# Patient Record
Sex: MALE | Race: WHITE | Employment: OTHER | URBAN - METROPOLITAN AREA
[De-identification: names, ages, dates, MRNs, and addresses within clinical notes are randomized per-mention and may not be internally consistent; named-entity substitution may affect disease eponyms.]

---

## 2018-04-19 ENCOUNTER — HOSPITAL ENCOUNTER (OUTPATIENT)
Dept: NON INVASIVE DIAGNOSTICS | Age: 73
Discharge: HOME | End: 2018-04-19
Attending: FAMILY MEDICINE | Admitting: FAMILY MEDICINE

## 2018-04-19 DIAGNOSIS — R00.2 PALPITATIONS: ICD-10-CM

## 2018-04-24 LAB
ACQUISITION DURATION: NORMAL S
AVERAGE HEART RATE: 55 BPM
EKG DIAGNOSIS: NORMAL
FASTEST SUPRAVENTRICULAR RATE: 93 BPM
HOLTER MAX HEART RATE: 115 BPM
HOOKUP DATE: NORMAL
HOOKUP TIME: NORMAL
LONGEST SUPRAVENTRICULAR RATE: 93 BPM
Lab: NORMAL
MAX HEART RATE TIME/DATE: NORMAL
MIN HEART RATE TIME/DATE: NORMAL
MIN HEART RATE: 38 BPM
NUMBER OF FASTEST SUPRAVENTRICULAR BEATS: 7
NUMBER OF LONGEST SUPRAVENTRICULAR BEATS: 7
NUMBER OF QRS COMPLEXES: NORMAL
NUMBER OF SUPRAVENTRICULAR BEATS IN RUNS: 7
NUMBER OF SUPRAVENTRICULAR COUPLETS: 0
NUMBER OF SUPRAVENTRICULAR ECTOPICS: 24
NUMBER OF SUPRAVENTRICULAR ISOLATED BEATS: 17
NUMBER OF SUPRAVENTRICULAR RUNS: 1
NUMBER OF VENTRICULAR BEATS IN RUNS: 0
NUMBER OF VENTRICULAR BIGEMINAL CYCLES: 40
NUMBER OF VENTRICULAR COUPLETS: 6
NUMBER OF VENTRICULAR ECTOPICS: 157
NUMBER OF VENTRICULAR ISOLATED BEATS: 145
NUMBER OF VENTRICULAR RUNS: 0

## 2018-07-09 ENCOUNTER — HOSPITAL ENCOUNTER (OUTPATIENT)
Dept: NON INVASIVE DIAGNOSTICS | Age: 73
Discharge: HOME | End: 2018-07-09
Attending: FAMILY MEDICINE | Admitting: FAMILY MEDICINE

## 2018-07-09 DIAGNOSIS — I20.8 OTHER FORMS OF ANGINA PECTORIS (HCC): ICD-10-CM

## 2021-05-05 ENCOUNTER — TELEPHONE (OUTPATIENT)
Dept: PULMONOLOGY | Age: 76
End: 2021-05-05

## 2021-05-05 NOTE — TELEPHONE ENCOUNTER
Called pt to make ref appt from [facility], left message to call back. Make sure to tell pt to bring cat scan on a disc.

## 2021-05-12 ENCOUNTER — TELEPHONE (OUTPATIENT)
Dept: PULMONOLOGY | Age: 76
End: 2021-05-12

## 2021-05-12 NOTE — TELEPHONE ENCOUNTER
Received referral from Pooja Pickett ([address]), called pt, left message for him to call us back to schedule appt. Also need to tell pt to bring disk of scans.

## 2021-06-11 ENCOUNTER — OFFICE VISIT (OUTPATIENT)
Dept: PULMONOLOGY | Age: 76
End: 2021-06-11
Payer: MEDICARE

## 2021-06-11 VITALS
SYSTOLIC BLOOD PRESSURE: 114 MMHG | HEIGHT: 69 IN | WEIGHT: 160 LBS | HEART RATE: 68 BPM | BODY MASS INDEX: 23.7 KG/M2 | OXYGEN SATURATION: 95 % | DIASTOLIC BLOOD PRESSURE: 70 MMHG

## 2021-06-11 DIAGNOSIS — J47.1 BRONCHIECTASIS WITH ACUTE EXACERBATION (HCC): ICD-10-CM

## 2021-06-11 DIAGNOSIS — Z85.528 H/O RENAL CELL CARCINOMA: ICD-10-CM

## 2021-06-11 DIAGNOSIS — Z87.891 HISTORY OF TOBACCO ABUSE: ICD-10-CM

## 2021-06-11 DIAGNOSIS — R93.89 ABNORMAL CT OF THE CHEST: Primary | ICD-10-CM

## 2021-06-11 PROCEDURE — 1036F TOBACCO NON-USER: CPT | Performed by: INTERNAL MEDICINE

## 2021-06-11 PROCEDURE — 3017F COLORECTAL CA SCREEN DOC REV: CPT | Performed by: INTERNAL MEDICINE

## 2021-06-11 PROCEDURE — G8427 DOCREV CUR MEDS BY ELIG CLIN: HCPCS | Performed by: INTERNAL MEDICINE

## 2021-06-11 PROCEDURE — G8420 CALC BMI NORM PARAMETERS: HCPCS | Performed by: INTERNAL MEDICINE

## 2021-06-11 PROCEDURE — 99204 OFFICE O/P NEW MOD 45 MIN: CPT | Performed by: INTERNAL MEDICINE

## 2021-06-11 PROCEDURE — 4040F PNEUMOC VAC/ADMIN/RCVD: CPT | Performed by: INTERNAL MEDICINE

## 2021-06-11 PROCEDURE — 1123F ACP DISCUSS/DSCN MKR DOCD: CPT | Performed by: INTERNAL MEDICINE

## 2021-06-11 RX ORDER — LEVOFLOXACIN 500 MG/1
500 TABLET, FILM COATED ORAL DAILY
Qty: 5 TABLET | Refills: 0 | Status: SHIPPED | OUTPATIENT
Start: 2021-06-11 | End: 2021-06-16

## 2021-06-11 NOTE — PROGRESS NOTES
PULMONARY OFFICE NEW PATIENT VISIT    CONSULTING PHYSICIAN:  Nuvia BHARDWAJ ,     REASON FOR VISIT:   Chief Complaint   Patient presents with    New Patient     ref by dr Kristian Palmer Other     bronchiectasis       DATE OF VISIT: 6/11/2021    HISTORY OF PRESENT ILLNESS: 76y.o. year old male comes into the pulmonary clinic for the first time. Patient reports that he had an ED surveillance CT chest done last year which revealed abnormal findings for which she has been referred to a pulmonologist.  He is diagnosed to have bronchiectasis for last several years with infrequent exacerbations. He does produce about quarter to half cup of mucus every day. Mucus is yellowish colored. He denies any hemoptysis, fevers, night sweats. No history of asthma attacks. Patient did smoke a few cigarettes every day for 25 years before quitting in 1985. He was an  and was not occupationally exposed to hazardous substance. He did get exposed to agent orange when he was serving in [facility] [employer].  Also has a history of renal cancer 6 years ago with a recent history of bladder cancer. Denies any anorexia or weight loss. No difficulty in expectorating mucus. Patient is fairly active. REVIEW OF SYSTEMS:   CONSTITUTIONAL SYMPTOMS: The patient denies fever, fatigue, night sweats, weight loss or weight gain. HEENT: No vision changes. No tinnitus, Denies sinus pain. No hoarseness, or dysphagia. NECK: Patient denies swelling in the neck. CARDIOVASCULAR: Denies chest pain, palpitation, syncope. RESPIRATORY: as per HPI. GASTROINTESTINAL: Denies nausea, abdominal pain or change in bowel function. GENITOURINARY: Denies obstructive symptoms. No history of incontinence. BREASTS: No masses or lumps in the breasts. SKIN: No rashes or itching. MUSCULOSKELETAL: Denies weakness or bone pain. NEUROLOGICAL: No headaches or seizures. PSYCHIATRIC: Denies mood swings or depression.    ENDOCRINE: Denies heat or cold lymphadenopathy:  CARDIOVASCULAR: S1 S2 RRR. Without murmer  RESPIRATORY & CHEST: bibasilar crackles heard with no wheezing heard. GASTROINTESTINAL & ABDOMEN: Soft, nontender, positive bowel sounds in all quadrants, non-distended, without hepatosplenomegaly. GENITOURINARY: Deferred. MUSCULOSKELETAL: No tenderness to palpation of the axial skeleton. There is no clubbing. No cyanosis. No edema of the lower extremities. SKIN OF BODY: No rash or jaundice. PSYCHIATRIC EVALUATION: Normal affect. Patient answers questions appropriately. HEMATOLOGIC/LYMPHATIC/ IMMUNOLOGIC: No palpable lymphadenopathy. NEUROLOGIC: Alert and oriented x 3. Groslly non-focal. Motor strength is 5+/5 in all muscle groups. The patient has a normal sensorium globally. LABS:    Lab Summary Latest Ref Rng & Units 9/18/2013 8/23/2010   WBC 4.0 - 11.0 K/uL 10.1 -   Hgb 13.5 - 17.5 g/dL 13.6 -   Hct 40.5 - 52.5 % 40.6 -   Platelets 530 - 261 K/uL 241 -   Sodium 136 - 145 mEq/L 140 141   Potassium 3.5 - 5.1 mEq/L 4.4 4.3   BUN 7 - 18 mg/dL 20(H) 21(H)   Creatinine 0.8 - 1.3 mg/dL 0.8 0.8   Glucose 70 - 99 mg/dL 93 89   Calcium 8.3 - 10.6 mg/dL 10.2 10.0   AST 15 - 37 U/L - 24   HDL cholesterol 40 - 60 mg/dl - 60   Triglycerides <150 mg/dl - 63   LDL calc <100 mg/dl - 92   VLDL calc Not Established mg/dl - 13           IMAGING:    CT chest done on 11/18/2020 at [facility]    FINDINGS:   LUNGS/AIRWAYS:  Unchanged widespread tree-in-bud nodularity, multifocal scarring, peribronchial thickening, and bronchiectasis. No new or enlarging pulmonary nodule. PLEURA: Unremarkable.  No pleural effusion or pneumothorax   MEDIASTINUM/MARIMAR:  Unremarkable   HEART/PERICARDIUM:  Mild coronary artery calcifications   VESSELS:  Unremarkable.  No aneurysm   CHEST WALL/LOWER NECK:  Unremarkable   UPPER ABDOMEN:  Left kidney is surgically absent   BONES:  Degenerative changes of the spine.    OTHER:  None            Pulmonary Functions Testing Results:          IMPRESSION AND RECOMMENDATIONS:     1. Abnormal CT of the chest  -I personally reviewed patient's CT chest from November 18, 2020. There is bilateral extensive bronchiectatic changes seen. Tree-in-bud appearance is more reflective of bronchiolitis than actual pulmonary nodules.  -No suspicious pulmonary nodules/masses seen. No suspicion for lung cancer.  -Patient currently having yellowish expectoration. I will give him a 5-day course of levofloxacin 500 mg p.o. daily.  -Also advised the patient to use Acapella device which he already has in order to help him expectorate more effectively.  -We will get a complete PFT done to assess for obstructive airway disease and to estimate his lung capacity. 2. H/O renal cell carcinoma  -Patient to follow-up with his primary oncologist.    3. History of tobacco abuse  -Strongly urged the patient to stay quit from smoking. Return in about 6 months (around 12/11/2021). Ginna Babb MD  Pulmonary Critical Care and Sleep Medicine  6/11/2021, 3:06 PM    This note was completed using dragon medical speech recognition software. Grammatical errors, random word insertions, pronoun errors and incomplete sentences are occasional consequences of this technology due to software limitations. If there are questions or concerns about the content of this note of information contained within the body of this dictation they should be addressed with the provider for clarification.

## 2021-12-13 ENCOUNTER — HOSPITAL ENCOUNTER (OUTPATIENT)
Dept: PULMONOLOGY | Age: 76
Discharge: HOME | End: 2021-12-13
Payer: MEDICARE

## 2021-12-13 DIAGNOSIS — J47.1 BRONCHIECTASIS WITH ACUTE EXACERBATION (HCC): ICD-10-CM

## 2021-12-13 LAB
DLCO %PRED: 65 %
DLCO PRED: NORMAL
DLCO/VA %PRED: NORMAL
DLCO/VA PRED: NORMAL
DLCO/VA: NORMAL
DLCO: NORMAL
EXPIRATORY TIME-POST: NORMAL
EXPIRATORY TIME: NORMAL
FEF 25-75% %CHNG: NORMAL
FEF 25-75% %PRED-POST: NORMAL
FEF 25-75% %PRED-PRE: NORMAL
FEF 25-75% PRED: NORMAL
FEF 25-75%-POST: NORMAL
FEF 25-75%-PRE: NORMAL
FEV1 %PRED-POST: 88 %
FEV1 %PRED-PRE: 92 %
FEV1 PRED: NORMAL
FEV1-POST: NORMAL
FEV1-PRE: NORMAL
FEV1/FVC %PRED-POST: NORMAL
FEV1/FVC %PRED-PRE: NORMAL
FEV1/FVC PRED: NORMAL
FEV1/FVC-POST: 97 %
FEV1/FVC-PRE: 92 %
FVC %PRED-POST: NORMAL
FVC %PRED-PRE: NORMAL
FVC PRED: NORMAL
FVC-POST: NORMAL
FVC-PRE: NORMAL
GAW %PRED: NORMAL
GAW PRED: NORMAL
GAW: NORMAL
IC %PRED: NORMAL
IC PRED: NORMAL
IC: NORMAL
MEP: NORMAL
MIP: NORMAL
MVV %PRED-PRE: NORMAL
MVV PRED: NORMAL
MVV-PRE: NORMAL
PEF %PRED-POST: NORMAL
PEF %PRED-PRE: NORMAL
PEF PRED: NORMAL
PEF%CHNG: NORMAL
PEF-POST: NORMAL
PEF-PRE: NORMAL
RAW %PRED: NORMAL
RAW PRED: NORMAL
RAW: NORMAL
RV %PRED: NORMAL
RV PRED: NORMAL
RV: NORMAL
SVC %PRED: NORMAL
SVC PRED: NORMAL
SVC: NORMAL
TLC %PRED: 82 %
TLC PRED: NORMAL
TLC: NORMAL
VA %PRED: NORMAL
VA PRED: NORMAL
VA: NORMAL
VTG %PRED: NORMAL
VTG PRED: NORMAL
VTG: NORMAL

## 2021-12-13 PROCEDURE — 6370000000 HC RX 637 (ALT 250 FOR IP): Performed by: INTERNAL MEDICINE

## 2021-12-13 PROCEDURE — 94640 AIRWAY INHALATION TREATMENT: CPT

## 2021-12-13 PROCEDURE — 94729 DIFFUSING CAPACITY: CPT

## 2021-12-13 PROCEDURE — 94060 EVALUATION OF WHEEZING: CPT

## 2021-12-13 PROCEDURE — 94726 PLETHYSMOGRAPHY LUNG VOLUMES: CPT

## 2021-12-13 RX ORDER — ALBUTEROL SULFATE 90 UG/1
4 AEROSOL, METERED RESPIRATORY (INHALATION) ONCE
Status: COMPLETED | OUTPATIENT
Start: 2021-12-13 | End: 2021-12-13

## 2021-12-13 RX ADMIN — ALBUTEROL SULFATE 4 PUFF: 90 AEROSOL, METERED RESPIRATORY (INHALATION) at 07:53

## 2021-12-13 ASSESSMENT — PULMONARY FUNCTION TESTS
FEV1/FVC_POST: 97
FEV1/FVC_PRE: 92
FEV1_PERCENT_PREDICTED_POST: 88
FEV1_PERCENT_PREDICTED_PRE: 92

## 2021-12-14 PROCEDURE — 94060 EVALUATION OF WHEEZING: CPT | Performed by: INTERNAL MEDICINE

## 2021-12-14 PROCEDURE — 94729 DIFFUSING CAPACITY: CPT | Performed by: INTERNAL MEDICINE

## 2021-12-14 PROCEDURE — 94726 PLETHYSMOGRAPHY LUNG VOLUMES: CPT | Performed by: INTERNAL MEDICINE

## 2022-01-06 ENCOUNTER — TELEPHONE (OUTPATIENT)
Dept: ORTHOPEDIC SURGERY | Age: 77
End: 2022-01-06

## 2022-01-07 ENCOUNTER — OFFICE VISIT (OUTPATIENT)
Dept: ORTHOPEDIC SURGERY | Age: 77
End: 2022-01-07
Payer: MEDICARE

## 2022-01-07 VITALS — WEIGHT: 160 LBS | BODY MASS INDEX: 23.7 KG/M2 | HEIGHT: 69 IN | RESPIRATION RATE: 16 BRPM

## 2022-01-07 DIAGNOSIS — M72.0 DUPUYTREN'S CONTRACTURE: Primary | ICD-10-CM

## 2022-01-07 PROCEDURE — 1123F ACP DISCUSS/DSCN MKR DOCD: CPT | Performed by: PHYSICIAN ASSISTANT

## 2022-01-07 PROCEDURE — G8427 DOCREV CUR MEDS BY ELIG CLIN: HCPCS | Performed by: PHYSICIAN ASSISTANT

## 2022-01-07 PROCEDURE — 99203 OFFICE O/P NEW LOW 30 MIN: CPT | Performed by: PHYSICIAN ASSISTANT

## 2022-01-07 PROCEDURE — 4040F PNEUMOC VAC/ADMIN/RCVD: CPT | Performed by: PHYSICIAN ASSISTANT

## 2022-01-07 PROCEDURE — 1036F TOBACCO NON-USER: CPT | Performed by: PHYSICIAN ASSISTANT

## 2022-01-07 PROCEDURE — G8484 FLU IMMUNIZE NO ADMIN: HCPCS | Performed by: PHYSICIAN ASSISTANT

## 2022-01-07 PROCEDURE — G8420 CALC BMI NORM PARAMETERS: HCPCS | Performed by: PHYSICIAN ASSISTANT

## 2022-01-07 NOTE — PROGRESS NOTES
Mr. Tanner Pryor is a 68 y.o. right handed man  who is seen today in Hand Surgical Consultation at the request of Paty Cassidy. He presents today regarding bilateral hand abnormality which has been present for approximately 10 years. A history of antecedent trauma or injury is Absent. He reports a thickening or mass on the palm with extension onto the Ring Finger. The size of the mass has been stable with time. He has noted any limitation of motion of the Ring Finger;  he does report mild discomfort associated with his presenting concern. He does not report any Family History of similar hand conditions. Previous treatment has included No prior treatment to the bilateral Ring Finger. He does not claim relation of his symptoms to his required work activities. He has not undergone any form of testing. I have today reviewed with Tanner Pryor the clinically relevant, past medical history, medications, allergies,  family history, social history, and Review Of Systems & I have documented any details relevant to today's presenting complaints in my history above. Mr. Esther Phelps self-reported past medical history, medications, allergies,  family history, social history, and Review Of Systems have been scanned into the chart under the \"Media\" tab. Physical Exam:  Mr. Esther Phelps most recent vitals:  Vitals  Resp: 16  Height: 5' 9\" (175.3 cm)  Weight: 160 lb (72.6 kg)    He is well nourished, oriented to person, place & time. He demonstrates appropriate mood and affect as well as normal gait and station. Skin: Normal in appearance, Normal Color, Normal Texture and Normal color, free of lesions excepting Clinical evidence of Dupuytren's Contracture Bilaterally   Thickening with nodule formation is noted in the palm along the axis of the Ring Finger on the Right, greater than Left. There is  extension of a cord into the digit itself.   Knuckle pads are evident over the dorsum of the Ring Finger on the Right, greater than Left. Wrist range of motion is Full bilaterally  Sensation is normal bilaterally. There is no evidence of gross joint instability bilaterally. Muscular strength is clinically appropriate bilaterally. Vascular examination reveals normal, good capillary refill and good color bilaterally. There is no Swelling of the digits bilaterally. There is a palpable 2 cm firm hard cord on the palm in the axis of both Ring Finger  with involvement of the Ring Finger  to the level of the MCP Joint. Finger joint range of motion is abnormal, with a flexed position of both Ring Finger MCP Joint measuring 25 degrees & PIP Joint measuring 10 degrees on the right and MCP joint measuring 15 degrees & PIP joint measuring 5 degrees on the left. The \"Table Top Test\" is positive. All other fingers and joints show normal motion bilaterally      Impression:  Mr. Shauna Gregory is showing clinical evidence of Dupuytren's Contracture, and presents requesting further treatment. Plan:    I have had a thorough discussion with Mr. Shauna Gregory regarding the treatment options available for his new onset bilateral  Ring Finger Dupuytren's Contracture, which is causing him significant concern and difficulty. I have outlined for Mr. Shauna Gregory the risk, benefits and consequences of the various treatment modalities, including a reasonable expectation for the long term success of each. We have discussed the likelihood that further more aggressive treatment may be required for his current presenting condition. Based upon our current discussion and a reasonable understating of the options available to him, Mr. Shauna Gregory has selected to proceed with a conservative plan of treatment consisting of: careful observation, intermittent reassessment of the degree of contracture (Table-Top Test) and maintenance of full range of motion of the uninvolved digits.   I have clearly explained to him that the above outlined treatment plan should not be expected to 'cure' his  Dupuytren's Contracture or resolve his current contractures, but we are rather treating the symptoms with which he presents. He has understood that in order to achieve more durable relief of his symptoms and to prevent future worsening or further damage, that definitive treatment, in the form of surgery or enzyme injection, would be required. Mr. Marques Bowie  voiced an appropriate understanding of our discussion, the options available to him, and of the expectations of his selected  treatment. I have also discussed with Mr. Marques Bowie  the other treatment options available to him  for this condition. We have today selected to proceed with conservative management. He and I have agreed that if our current course of conservative treatment does not prove to be effective over the short term future, that he will schedule a follow-up appointment to discuss and select an alternate course of therapy including possibly injection or surgical treatment. Mr. Marques Bowie has been given a full verbal list of instructions and precautions related to his present condition. I have asked him to followup with me in the office at the prescribed time. He is also specifically requested to call or return to the office sooner if his symptoms change or worsen prior to the next scheduled appointment.

## 2022-01-18 ENCOUNTER — OFFICE VISIT (OUTPATIENT)
Dept: PULMONOLOGY | Age: 77
End: 2022-01-18
Payer: MEDICARE

## 2022-01-18 VITALS
HEART RATE: 69 BPM | OXYGEN SATURATION: 97 % | WEIGHT: 148.2 LBS | SYSTOLIC BLOOD PRESSURE: 104 MMHG | DIASTOLIC BLOOD PRESSURE: 62 MMHG | BODY MASS INDEX: 21.95 KG/M2 | HEIGHT: 69 IN

## 2022-01-18 DIAGNOSIS — J47.1 BRONCHIECTASIS WITH ACUTE EXACERBATION (HCC): Primary | ICD-10-CM

## 2022-01-18 DIAGNOSIS — Z87.891 HISTORY OF TOBACCO ABUSE: ICD-10-CM

## 2022-01-18 DIAGNOSIS — R93.89 ABNORMAL CT OF THE CHEST: ICD-10-CM

## 2022-01-18 PROCEDURE — G8427 DOCREV CUR MEDS BY ELIG CLIN: HCPCS | Performed by: INTERNAL MEDICINE

## 2022-01-18 PROCEDURE — G8420 CALC BMI NORM PARAMETERS: HCPCS | Performed by: INTERNAL MEDICINE

## 2022-01-18 PROCEDURE — 99214 OFFICE O/P EST MOD 30 MIN: CPT | Performed by: INTERNAL MEDICINE

## 2022-01-18 PROCEDURE — 4040F PNEUMOC VAC/ADMIN/RCVD: CPT | Performed by: INTERNAL MEDICINE

## 2022-01-18 PROCEDURE — 1123F ACP DISCUSS/DSCN MKR DOCD: CPT | Performed by: INTERNAL MEDICINE

## 2022-01-18 PROCEDURE — G8484 FLU IMMUNIZE NO ADMIN: HCPCS | Performed by: INTERNAL MEDICINE

## 2022-01-18 PROCEDURE — 1036F TOBACCO NON-USER: CPT | Performed by: INTERNAL MEDICINE

## 2022-01-18 NOTE — PROGRESS NOTES
PULMONARY OFFICE FOLLOW-UP VISIT    CONSULTING PHYSICIAN:  Jacki BHARDWAJ ,     REASON FOR VISIT:   Chief Complaint   Patient presents with    Results     PFT       DATE OF VISIT: 1/18/2022    HISTORY OF PRESENT ILLNESS: 68y.o. year old male comes into the pulmonary clinic for follow-up. Patient reports stable breathing without any new issues. Still continues to have half a cup of mucus expectorated every day. Mucus color is whitish to yellowish. He denies any discolored mucus, hemoptysis, fevers, night sweats, chest pain or chest tightness. Fairly active without any exercise limitation. Weight has been stable. Previously:   Patient reports that he had an ED surveillance CT chest done last year which revealed abnormal findings for which he has been referred to a pulmonologist.  He is diagnosed to have bronchiectasis for last several years with infrequent exacerbations. He does produce about quarter to half cup of mucus every day. Mucus is yellowish colored. He denies any hemoptysis, fevers, night sweats. No history of asthma attacks. Patient did smoke a few cigarettes every day for 25 years before quitting in 1985. He was an  and was not occupationally exposed to hazardous substance. He did get exposed to agent orange when he was serving in [facility].  Also has a history of renal cancer 6 years ago with a recent history of bladder cancer. Denies any anorexia or weight loss. No difficulty in expectorating mucus. Patient is fairly active. REVIEW OF SYSTEMS:   8 point review of system is negative except that mentioned in the HPI.     PAST MEDICAL HISTORY:   Past Medical History:   Diagnosis Date    Bladder cancer ([address]     Cancer ([address]     Hyperlipidemia     Kidney carcinoma ([address]        PAST SURGICAL HISTORY:   Past Surgical History:   Procedure Laterality Date    INGUINAL HERNIA REPAIR  5/4/16    OPEN left inguinal hernia repair with mesh     KIDNEY REMOVAL      LUNG SURGERY      PARTIAL NEPHRECTOMY Left     SINUS SURGERY         SOCIAL HISTORY:   Social History     Tobacco Use    Smoking status: Former Smoker     Years: 25.00     Types: Cigarettes     Quit date: 1985     Years since quittin.3    Smokeless tobacco: Never Used   Vaping Use    Vaping Use: Never used   Substance Use Topics    Alcohol use: Yes     Comment: wine with dinner    Drug use: Yes       FAMILY HISTORY:   Family History   Problem Relation Age of Onset    Alcohol Abuse Father     Coronary Art Dis Father     Cancer Father         liver    Alcohol Abuse Mother     Cancer Mother         colon    Alcohol Abuse Daughter        MEDICATIONS:     Current Outpatient Medications on File Prior to Visit   Medication Sig Dispense Refill    Multiple Vitamins-Minerals (CENTRUM SILVER PO) Take by mouth      simvastatin (ZOCOR) 20 MG tablet Take 20 mg by mouth nightly. No current facility-administered medications on file prior to visit. ALLERGIES:   Allergies as of 2022 - Fully Reviewed 2022   Allergen Reaction Noted    Amoxicillin Nausea Only 2021      OBJECTIVE:   height is 5' 9\" (1.753 m) and weight is 148 lb 3.2 oz (67.2 kg). His blood pressure is 104/62 and his pulse is 69. His oxygen saturation is 97%. PHYSICAL EXAM:    CONSTITUTIONAL: He is a 68y.o.-year-old who appears his stated age. He is alert and oriented x 3 and in no acute distress. HEENT: PERRLA, EOMI. No scleral icterus. No thrush, atraumatic, normocephalic. NECK: Supple, without cervical or supraclavicular lymphadenopathy:  CARDIOVASCULAR: S1 S2 RRR. Without murmer  RESPIRATORY & CHEST: bibasilar crackles heard with no wheezing heard. GASTROINTESTINAL & ABDOMEN: Soft, nontender, positive bowel sounds in all quadrants, non-distended, without hepatosplenomegaly. GENITOURINARY: Deferred. MUSCULOSKELETAL: No tenderness to palpation of the axial skeleton. There is no clubbing. No cyanosis. No edema of the lower extremities. SKIN OF BODY: No rash or jaundice. PSYCHIATRIC EVALUATION: Normal affect. Patient answers questions appropriately. HEMATOLOGIC/LYMPHATIC/ IMMUNOLOGIC: No palpable lymphadenopathy. NEUROLOGIC: Alert and oriented x 3. Groslly non-focal. Motor strength is 5+/5 in all muscle groups. The patient has a normal sensorium globally. LABS:    Lab Summary Latest Ref Rng & Units 9/18/2013   WBC 4.0 - 11.0 K/uL 10.1   Hgb 13.5 - 17.5 g/dL 13.6   Hct 40.5 - 52.5 % 40.6   Platelets 773 - 911 K/uL 241   Sodium 136 - 145 mEq/L 140   Potassium 3.5 - 5.1 mEq/L 4.4   BUN 7 - 18 mg/dL 20(H)   Creatinine 0.8 - 1.3 mg/dL 0.8   Glucose 70 - 99 mg/dL 93   Calcium 8.3 - 10.6 mg/dL 10.2   Some recent data might be hidden           IMAGING:    CT chest done on 11/18/2020 at [address] system    FINDINGS:   LUNGS/AIRWAYS:  Unchanged widespread tree-in-bud nodularity, multifocal scarring, peribronchial thickening, and bronchiectasis. No new or enlarging pulmonary nodule. PLEURA: Unremarkable.  No pleural effusion or pneumothorax   MEDIASTINUM/MARIMAR:  Unremarkable   HEART/PERICARDIUM:  Mild coronary artery calcifications   VESSELS:  Unremarkable.  No aneurysm   CHEST WALL/LOWER NECK:  Unremarkable   UPPER ABDOMEN:  Left kidney is surgically absent   BONES:  Degenerative changes of the spine. OTHER:  None            Pulmonary Functions Testing Results:    Complete PFT done on 12/13/2021  FEV1 prebronchodilator 2.69/92% post 2.57/88.  -4% change  FVC prebronchodilator 3.86/99% post 3.50/90%. -9% change  FEV1/FVC prebronchodilator 70 post 73  Total lung capacity: 82%  Residual volume: 77%  Diffusion capacity: 65%  Airway resistance: 129%    This complete PFT was personally reviewed by me and my interpretation is: Normal spirometry. Increased airway resistance with reduced diffusion capacity.   Impression: Possible very mild obstructive airway disease with reduced diffusion capacity. IMPRESSION AND RECOMMENDATIONS:     1. Bronchiectasis  -Patient's respiratory status is stable.  -His CT chest from November 18, 2020 showed bilateral extensive bronchiectatic changes.    -No suspicious pulmonary nodules/masses seen. No suspicion for lung cancer. -Advised patient to try postural therapy and Acapella device to continue expectorating the mucus.  -He will let me know if he is having any discolored expectoration, fevers, hemoptysis. -Complete PFT shows very mild obstructive airway disease but patient does not have any shortness of breath. No indications for inhalers at this time. 2. H/O renal cell carcinoma  -Patient to follow-up with his primary oncologist.    3. History of tobacco abuse  -Strongly urged the patient to stay quit from smoking. Return in about 6 months (around 7/18/2022). Husam Breen MD  Pulmonary Critical Care and Sleep Medicine  1/18/2022, 10:24 AM    This note was completed using dragon medical speech recognition software. Grammatical errors, random word insertions, pronoun errors and incomplete sentences are occasional consequences of this technology due to software limitations. If there are questions or concerns about the content of this note of information contained within the body of this dictation they should be addressed with the provider for clarification.

## 2022-07-18 ENCOUNTER — OFFICE VISIT (OUTPATIENT)
Dept: PULMONOLOGY | Age: 77
End: 2022-07-18
Payer: MEDICARE

## 2022-07-18 VITALS
HEART RATE: 65 BPM | DIASTOLIC BLOOD PRESSURE: 70 MMHG | OXYGEN SATURATION: 98 % | HEIGHT: 69 IN | WEIGHT: 146 LBS | BODY MASS INDEX: 21.62 KG/M2 | SYSTOLIC BLOOD PRESSURE: 128 MMHG

## 2022-07-18 DIAGNOSIS — Z87.891 HISTORY OF TOBACCO ABUSE: ICD-10-CM

## 2022-07-18 DIAGNOSIS — J47.1 BRONCHIECTASIS WITH ACUTE EXACERBATION (HCC): Primary | ICD-10-CM

## 2022-07-18 DIAGNOSIS — R93.89 ABNORMAL CT OF THE CHEST: ICD-10-CM

## 2022-07-18 PROCEDURE — 1123F ACP DISCUSS/DSCN MKR DOCD: CPT | Performed by: INTERNAL MEDICINE

## 2022-07-18 PROCEDURE — 99214 OFFICE O/P EST MOD 30 MIN: CPT | Performed by: INTERNAL MEDICINE

## 2022-07-18 PROCEDURE — 1036F TOBACCO NON-USER: CPT | Performed by: INTERNAL MEDICINE

## 2022-07-18 PROCEDURE — G8420 CALC BMI NORM PARAMETERS: HCPCS | Performed by: INTERNAL MEDICINE

## 2022-07-18 PROCEDURE — G8427 DOCREV CUR MEDS BY ELIG CLIN: HCPCS | Performed by: INTERNAL MEDICINE

## 2022-07-18 NOTE — PROGRESS NOTES
PULMONARY OFFICE FOLLOW-UP VISIT    CONSULTING PHYSICIAN:  Phu BHARDWAJ ,     REASON FOR VISIT:   Chief Complaint   Patient presents with    Follow-up     6 MO       DATE OF VISIT: 7/18/2022    HISTORY OF PRESENT ILLNESS: 68y.o. year old male comes into the pulmonary clinic for follow-up. Patient reports stable breathing without any new symptoms. Has been fairly active and recently participated in a [employer] run. PMucus color is whitish to yellowish. He denies any discolored mucus, hemoptysis, fevers, night sweats, chest pain or chest tightness. Weight has been stable. Previously:   Patient reports that he had an ED surveillance CT chest done last year which revealed abnormal findings for which he has been referred to a pulmonologist.  He is diagnosed to have bronchiectasis for last several years with infrequent exacerbations. He does produce about quarter to half cup of mucus every day. Mucus is yellowish colored. He denies any hemoptysis, fevers, night sweats. No history of asthma attacks. Patient did smoke a few cigarettes every day for 25 years before quitting in 1985. He was an  and was not occupationally exposed to hazardous substance. He did get exposed to agent orange when he was serving in [facility].  Also has a history of renal cancer 6 years ago with a recent history of bladder cancer. Denies any anorexia or weight loss. No difficulty in expectorating mucus. Patient is fairly active. REVIEW OF SYSTEMS:   8 point review of system is negative except that mentioned in the HPI.     PAST MEDICAL HISTORY:   Past Medical History:   Diagnosis Date    Bladder cancer ([address]     Cancer ([address]     Hyperlipidemia     Kidney carcinoma ([address]        PAST SURGICAL HISTORY:   Past Surgical History:   Procedure Laterality Date    INGUINAL HERNIA REPAIR  5/4/16    OPEN left inguinal hernia repair with mesh     KIDNEY REMOVAL      LUNG SURGERY  1999    PARTIAL NEPHRECTOMY Left     SINUS SURGERY         SOCIAL HISTORY:   Social History     Tobacco Use    Smoking status: Former     Years: 25.00     Types: Cigarettes     Quit date: 1985     Years since quittin.8    Smokeless tobacco: Never   Vaping Use    Vaping Use: Never used   Substance Use Topics    Alcohol use: Yes     Comment: wine with dinner    Drug use: Yes       FAMILY HISTORY:   Family History   Problem Relation Age of Onset    Alcohol Abuse Father     Coronary Art Dis Father     Cancer Father         liver    Alcohol Abuse Mother     Cancer Mother         colon    Alcohol Abuse Daughter        MEDICATIONS:     Current Outpatient Medications on File Prior to Visit   Medication Sig Dispense Refill    Multiple Vitamins-Minerals (CENTRUM SILVER PO) Take by mouth      simvastatin (ZOCOR) 20 MG tablet Take 20 mg by mouth nightly. No current facility-administered medications on file prior to visit. ALLERGIES:   Allergies as of 2022 - Fully Reviewed 2022   Allergen Reaction Noted    Amoxicillin Nausea Only 2021      OBJECTIVE:   height is 5' 9\" (1.753 m) and weight is 146 lb (66.2 kg). His blood pressure is 128/70 and his pulse is 65. His oxygen saturation is 98%. PHYSICAL EXAM:    CONSTITUTIONAL: He is a 68y.o.-year-old who appears his stated age. He is alert and oriented x 3 and in no acute distress. HEENT: PERRLA, EOMI. No scleral icterus. No thrush, atraumatic, normocephalic. NECK: Supple, without cervical or supraclavicular lymphadenopathy:  CARDIOVASCULAR: S1 S2 RRR. Without murmer  RESPIRATORY & CHEST: bibasilar crackles heard with no wheezing heard. GASTROINTESTINAL & ABDOMEN: Soft, nontender, positive bowel sounds in all quadrants, non-distended, without hepatosplenomegaly. GENITOURINARY: Deferred. MUSCULOSKELETAL: No tenderness to palpation of the axial skeleton. There is no clubbing. No cyanosis. No edema of the lower extremities. SKIN OF BODY: No rash or jaundice. PSYCHIATRIC EVALUATION: Normal affect. Patient answers questions appropriately. HEMATOLOGIC/LYMPHATIC/ IMMUNOLOGIC: No palpable lymphadenopathy. NEUROLOGIC: Alert and oriented x 3. Groslly non-focal. Motor strength is 5+/5 in all muscle groups. The patient has a normal sensorium globally. LABS:    No flowsheet data found. IMAGING:    CT chest done on 11/18/2020 at [facility]:   LUNGS/AIRWAYS:  Unchanged widespread tree-in-bud nodularity, multifocal scarring, peribronchial thickening, and bronchiectasis. No new or enlarging pulmonary nodule. PLEURA: Unremarkable. No pleural effusion or pneumothorax   MEDIASTINUM/MARIMAR:  Unremarkable   HEART/PERICARDIUM:  Mild coronary artery calcifications   VESSELS:  Unremarkable. No aneurysm   CHEST WALL/LOWER NECK:  Unremarkable   UPPER ABDOMEN:  Left kidney is surgically absent   BONES:  Degenerative changes of the spine. OTHER:  None            Pulmonary Functions Testing Results:    Complete PFT done on 12/13/2021  FEV1 prebronchodilator 2.69/92% post 2.57/88.  -4% change  FVC prebronchodilator 3.86/99% post 3.50/90%. -9% change  FEV1/FVC prebronchodilator 70 post 73  Total lung capacity: 82%  Residual volume: 77%  Diffusion capacity: 65%  Airway resistance: 129%    This complete PFT was personally reviewed by me and my interpretation is: Normal spirometry. Increased airway resistance with reduced diffusion capacity. Impression: Possible very mild obstructive airway disease with reduced diffusion capacity. IMPRESSION AND RECOMMENDATIONS:     1. Bronchiectasis  -Patient's respiratory status is stable.  -His CT chest from November 18, 2020 showed bilateral extensive bronchiectatic changes.    -No suspicious pulmonary nodules/masses seen. No suspicion for lung cancer.  -He will continue to use postural therapy for expectoration.   Can use Acapella device if unable to expectorate.  -He will let me know if he is having any discolored expectoration, fevers, hemoptysis. -Complete PFT shows very mild obstructive airway disease but patient does not have any shortness of breath. No indications for inhalers at this time. 2. H/O renal cell carcinoma  -Patient to follow-up with his primary oncologist.    3. History of tobacco abuse  -Strongly urged the patient to stay quit from smoking. Return in about 1 year (around 7/18/2023). Donald Barillas MD  Pulmonary Critical Care and Sleep Medicine  7/18/2022, 10:51 AM    This note was completed using dragon medical speech recognition software. Grammatical errors, random word insertions, pronoun errors and incomplete sentences are occasional consequences of this technology due to software limitations. If there are questions or concerns about the content of this note of information contained within the body of this dictation they should be addressed with the provider for clarification.

## 2022-08-01 ENCOUNTER — HOSPITAL ENCOUNTER (OUTPATIENT)
Dept: CT IMAGING | Age: 77
Discharge: HOME | End: 2022-08-01
Payer: MEDICARE

## 2022-08-01 DIAGNOSIS — Z85.528 PERSONAL HISTORY OF KIDNEY CANCER: ICD-10-CM

## 2022-08-01 DIAGNOSIS — Z85.528 H/O RENAL CELL CARCINOMA: ICD-10-CM

## 2022-08-01 PROCEDURE — 6360000004 HC RX CONTRAST MEDICATION: Performed by: INTERNAL MEDICINE

## 2022-08-01 PROCEDURE — 74176 CT ABD & PELVIS W/O CONTRAST: CPT

## 2022-08-01 RX ADMIN — IOHEXOL 50 ML: 240 INJECTION, SOLUTION INTRATHECAL; INTRAVASCULAR; INTRAVENOUS; ORAL at 10:45

## 2023-07-03 ENCOUNTER — HOSPITAL ENCOUNTER (OUTPATIENT)
Dept: ULTRASOUND IMAGING | Age: 78
Discharge: HOME | End: 2023-07-03
Payer: MEDICARE

## 2023-07-03 ENCOUNTER — HOSPITAL ENCOUNTER (OUTPATIENT)
Dept: WOMENS IMAGING | Age: 78
Discharge: HOME | End: 2023-07-03
Payer: MEDICARE

## 2023-07-03 DIAGNOSIS — N64.4 BREAST PAIN IN MALE: ICD-10-CM

## 2023-07-03 DIAGNOSIS — N64.4 BREAST PAIN: ICD-10-CM

## 2023-07-03 DIAGNOSIS — N63.42 SUBAREOLAR MASS OF LEFT BREAST: ICD-10-CM

## 2023-07-03 PROCEDURE — 76642 ULTRASOUND BREAST LIMITED: CPT

## 2023-07-03 PROCEDURE — G0279 TOMOSYNTHESIS, MAMMO: HCPCS

## 2023-07-31 ENCOUNTER — OFFICE VISIT (OUTPATIENT)
Dept: PULMONOLOGY | Age: 78
End: 2023-07-31
Payer: MEDICARE

## 2023-07-31 VITALS
OXYGEN SATURATION: 97 % | WEIGHT: 150 LBS | HEIGHT: 69 IN | HEART RATE: 64 BPM | SYSTOLIC BLOOD PRESSURE: 124 MMHG | DIASTOLIC BLOOD PRESSURE: 62 MMHG | BODY MASS INDEX: 22.22 KG/M2

## 2023-07-31 DIAGNOSIS — J47.1 BRONCHIECTASIS WITH ACUTE EXACERBATION (HCC): Primary | ICD-10-CM

## 2023-07-31 DIAGNOSIS — R93.89 ABNORMAL CT OF THE CHEST: ICD-10-CM

## 2023-07-31 DIAGNOSIS — Z87.891 HISTORY OF TOBACCO ABUSE: ICD-10-CM

## 2023-07-31 PROCEDURE — 1123F ACP DISCUSS/DSCN MKR DOCD: CPT | Performed by: INTERNAL MEDICINE

## 2023-07-31 PROCEDURE — 1036F TOBACCO NON-USER: CPT | Performed by: INTERNAL MEDICINE

## 2023-07-31 PROCEDURE — G8420 CALC BMI NORM PARAMETERS: HCPCS | Performed by: INTERNAL MEDICINE

## 2023-07-31 PROCEDURE — 99214 OFFICE O/P EST MOD 30 MIN: CPT | Performed by: INTERNAL MEDICINE

## 2023-07-31 PROCEDURE — G8427 DOCREV CUR MEDS BY ELIG CLIN: HCPCS | Performed by: INTERNAL MEDICINE

## 2023-07-31 RX ORDER — SIMVASTATIN 20 MG
20 TABLET ORAL
COMMUNITY

## 2023-07-31 NOTE — PROGRESS NOTES
reviewed by me and my interpretation is: Normal spirometry. Increased airway resistance with reduced diffusion capacity. Impression: Possible very mild obstructive airway disease with reduced diffusion capacity. IMPRESSION AND RECOMMENDATIONS:     1. Bronchiectasis  -Patient's respiratory status continues to remain stable.  -His CT chest from November 18, 2020 showed bilateral extensive bronchiectatic changes.    -No suspicious pulmonary nodules/masses seen. No suspicion for lung cancer.  -He will continue to use postural therapy for expectoration. Can use Acapella device if unable to expectorate.  -He will let me know if he is having any discolored expectoration, fevers, hemoptysis. Advised him to stay active.  -Complete PFT shows very mild obstructive airway disease but patient does not have any shortness of breath. No indications for inhalers at this time.  -I did discuss with him the risk of pneumonitis with immunotherapy. Since there is no way to predict whether he will suffer with pneumonitis, I alerted him about different pneumonitis symptoms that he should be on the look out for. 2. H/O renal cell carcinoma  -Patient to follow-up with his primary oncologist and urologist.    3. History of tobacco abuse  -Strongly urged the patient to stay quit from smoking. Return in about 1 year (around 7/31/2024). Lala Don MD  Pulmonary Critical Care and Sleep Medicine  7/31/2023, 11:51 AM    This note was completed using dragon medical speech recognition software. Grammatical errors, random word insertions, pronoun errors and incomplete sentences are occasional consequences of this technology due to software limitations. If there are questions or concerns about the content of this note of information contained within the body of this dictation they should be addressed with the provider for clarification.

## 2024-07-03 ENCOUNTER — OFFICE VISIT (OUTPATIENT)
Dept: PULMONOLOGY | Age: 79
End: 2024-07-03
Payer: MEDICARE

## 2024-07-03 VITALS
HEIGHT: 69 IN | DIASTOLIC BLOOD PRESSURE: 68 MMHG | WEIGHT: 151 LBS | BODY MASS INDEX: 22.36 KG/M2 | HEART RATE: 62 BPM | SYSTOLIC BLOOD PRESSURE: 126 MMHG

## 2024-07-03 DIAGNOSIS — Z87.891 HISTORY OF TOBACCO ABUSE: ICD-10-CM

## 2024-07-03 DIAGNOSIS — J47.1 BRONCHIECTASIS WITH ACUTE EXACERBATION (HCC): Primary | ICD-10-CM

## 2024-07-03 DIAGNOSIS — R93.89 ABNORMAL CT OF THE CHEST: ICD-10-CM

## 2024-07-03 PROBLEM — J47.9 BRONCHIECTASIS WITHOUT COMPLICATION (HCC): Status: ACTIVE | Noted: 2024-07-03

## 2024-07-03 PROCEDURE — G8420 CALC BMI NORM PARAMETERS: HCPCS | Performed by: INTERNAL MEDICINE

## 2024-07-03 PROCEDURE — 99214 OFFICE O/P EST MOD 30 MIN: CPT | Performed by: INTERNAL MEDICINE

## 2024-07-03 PROCEDURE — 1123F ACP DISCUSS/DSCN MKR DOCD: CPT | Performed by: INTERNAL MEDICINE

## 2024-07-03 PROCEDURE — 1036F TOBACCO NON-USER: CPT | Performed by: INTERNAL MEDICINE

## 2024-07-03 PROCEDURE — G8427 DOCREV CUR MEDS BY ELIG CLIN: HCPCS | Performed by: INTERNAL MEDICINE

## 2024-07-03 RX ORDER — GUAIFENESIN 600 MG/1
600 TABLET, EXTENDED RELEASE ORAL 2 TIMES DAILY
Qty: 10 TABLET | Refills: 0 | Status: SHIPPED | OUTPATIENT
Start: 2024-07-03 | End: 2024-07-08

## 2024-07-03 NOTE — PROGRESS NOTES
prebronchodilator 70 post 73  Total lung capacity: 82%  Residual volume: 77%  Diffusion capacity: 65%  Airway resistance: 129%    This complete PFT was personally reviewed by me and my interpretation is: Normal spirometry.  Increased airway resistance with reduced diffusion capacity.  Impression: Possible very mild obstructive airway disease with reduced diffusion capacity.      IMPRESSION AND RECOMMENDATIONS:     1.  Bronchiectasis  -Patient's respiratory status continues to remain stable.  -His CT chest from 2020 and 2022 both of showed bilateral lower lobe bronchiectasis.  -No suspicious pulmonary nodules/masses seen.  No suspicion for lung cancer.  -Patient continues to use Acapella therapy but does not have any significant relief.  -At this time due to high risk of mucous plugging and inability to cough up enough mucus, I will start him on chest vest therapy at least twice a day.  -He will let me know if he is having any discolored expectoration, fevers, hemoptysis.  Advised him to stay active.  -Complete PFT shows very mild obstructive airway disease but patient does not have any shortness of breath.  No indications for inhalers at this time.  -I have also given him a prescription for Mucinex 6 mg twice daily to be taken for 5 days in case his mucus becomes too thick.    2. H/O renal cell carcinoma  -Patient to follow-up with his primary oncologist and urologist.    3. History of tobacco abuse  -Strongly urged the patient to stay quit from smoking.      Return in about 1 year (around 7/3/2025) for bronchiectasis.         Azael Hester MD  Pulmonary Critical Care and Sleep Medicine  7/3/2024, 3:30 PM    This note was completed using dragon medical speech recognition software. Grammatical errors, random word insertions, pronoun errors and incomplete sentences are occasional consequences of this technology due to software limitations. If there are questions or concerns about the content of this note of

## 2024-07-09 ENCOUNTER — TELEPHONE (OUTPATIENT)
Dept: PULMONOLOGY | Age: 79
End: 2024-07-09

## 2024-07-09 NOTE — TELEPHONE ENCOUNTER
Melly with [employer] called stating that all insurance criteria has been met and they're currently trying to connect with the patient for shipping. She will let you know when its shipped.    CB:[phone]

## 2025-07-17 ENCOUNTER — OFFICE VISIT (OUTPATIENT)
Dept: PULMONOLOGY | Age: 80
End: 2025-07-17
Payer: MEDICARE

## 2025-07-17 VITALS
OXYGEN SATURATION: 95 % | BODY MASS INDEX: 23.7 KG/M2 | SYSTOLIC BLOOD PRESSURE: 108 MMHG | HEIGHT: 69 IN | DIASTOLIC BLOOD PRESSURE: 62 MMHG | WEIGHT: 160 LBS | HEART RATE: 75 BPM

## 2025-07-17 DIAGNOSIS — R93.89 ABNORMAL CT OF THE CHEST: ICD-10-CM

## 2025-07-17 DIAGNOSIS — Z87.891 HISTORY OF TOBACCO ABUSE: ICD-10-CM

## 2025-07-17 DIAGNOSIS — J47.1 BRONCHIECTASIS WITH ACUTE EXACERBATION (HCC): Primary | ICD-10-CM

## 2025-07-17 PROCEDURE — 1159F MED LIST DOCD IN RCRD: CPT | Performed by: INTERNAL MEDICINE

## 2025-07-17 PROCEDURE — G8420 CALC BMI NORM PARAMETERS: HCPCS | Performed by: INTERNAL MEDICINE

## 2025-07-17 PROCEDURE — 99214 OFFICE O/P EST MOD 30 MIN: CPT | Performed by: INTERNAL MEDICINE

## 2025-07-17 PROCEDURE — 1123F ACP DISCUSS/DSCN MKR DOCD: CPT | Performed by: INTERNAL MEDICINE

## 2025-07-17 PROCEDURE — G2211 COMPLEX E/M VISIT ADD ON: HCPCS | Performed by: INTERNAL MEDICINE

## 2025-07-17 PROCEDURE — G8427 DOCREV CUR MEDS BY ELIG CLIN: HCPCS | Performed by: INTERNAL MEDICINE

## 2025-07-17 PROCEDURE — 1036F TOBACCO NON-USER: CPT | Performed by: INTERNAL MEDICINE

## 2025-07-17 NOTE — PROGRESS NOTES
PULMONARY OFFICE FOLLOW-UP VISIT    CONSULTING PHYSICIAN:  Michele Birch APRN - CNP ,     REASON FOR VISIT:   Chief Complaint   Patient presents with    1 Year Follow Up     Bronchiectasis       DATE OF VISIT: 7/17/2025    HISTORY OF PRESENT ILLNESS: 79 y.o. year old male comes into the pulmonary clinic for follow-up.  Reports stable breathing.  Has not used his chest vest as he feels that he can expectorate by himself better.  Mucus remains clear.  Denies any fevers, night sweats.      Previously: Continues to expectorate throughout the day.  Able to produce at least half to 1 cup of clear mucus throughout the day.  Does not report any shortness of breath.  Denies any wheezing.  Mucus is clear and not discolored.  No hemoptysis or fever seen.  Still remains very active.  Has had nephrectomy for renal cancer.  Also finished immunotherapy. Patient reports that his breathing has been stable for the most part.  Occasionally he has been coughing without any increase in his expectoration.  Expectoration remains clear without any hemoptysis.  He is fairly active and recently finished a 5K run without difficulty.  His urologist is planning to start him on immunotherapy due to recurrent stage I bladder cancer.   Patient reports stable breathing without any new symptoms.  Has been fairly active and recently participated in a 5K run.  PMucus color is whitish to yellowish.  He denies any discolored mucus, hemoptysis, fevers, night sweats, chest pain or chest tightness.  Weight has been stable.    Patient reports that he had an ED surveillance CT chest done last year which revealed abnormal findings for which he has been referred to a pulmonologist.  He is diagnosed to have bronchiectasis for last several years with infrequent exacerbations.  He does produce about quarter to half cup of mucus every day.  Mucus is yellowish colored.  He denies any hemoptysis, fevers, night sweats.  No history of asthma attacks.  Patient did